# Patient Record
Sex: MALE | Race: WHITE | NOT HISPANIC OR LATINO | Employment: OTHER | URBAN - METROPOLITAN AREA
[De-identification: names, ages, dates, MRNs, and addresses within clinical notes are randomized per-mention and may not be internally consistent; named-entity substitution may affect disease eponyms.]

---

## 2018-02-27 ENCOUNTER — PREP FOR SURGERY (OUTPATIENT)
Dept: OTHER | Facility: HOSPITAL | Age: 68
End: 2018-02-27

## 2018-02-27 DIAGNOSIS — Z80.0 FAMILY HX OF COLON CANCER: Primary | ICD-10-CM

## 2018-02-27 DIAGNOSIS — K21.9 GASTROESOPHAGEAL REFLUX DISEASE, ESOPHAGITIS PRESENCE NOT SPECIFIED: ICD-10-CM

## 2018-03-01 PROBLEM — K21.9 GASTROESOPHAGEAL REFLUX DISEASE: Status: ACTIVE | Noted: 2018-03-01

## 2018-03-01 PROBLEM — Z80.0 FAMILY HX OF COLON CANCER: Status: ACTIVE | Noted: 2018-03-01

## 2018-03-07 ENCOUNTER — ANESTHESIA EVENT (OUTPATIENT)
Dept: GASTROENTEROLOGY | Facility: HOSPITAL | Age: 68
End: 2018-03-07

## 2018-03-07 ENCOUNTER — HOSPITAL ENCOUNTER (OUTPATIENT)
Facility: HOSPITAL | Age: 68
Setting detail: HOSPITAL OUTPATIENT SURGERY
Discharge: HOME | End: 2018-03-07
Attending: SURGERY | Admitting: SURGERY

## 2018-03-07 ENCOUNTER — ANESTHESIA (OUTPATIENT)
Dept: GASTROENTEROLOGY | Facility: HOSPITAL | Age: 68
End: 2018-03-07

## 2018-03-07 VITALS
RESPIRATION RATE: 15 BRPM | BODY MASS INDEX: 24.78 KG/M2 | HEIGHT: 73 IN | WEIGHT: 187 LBS | DIASTOLIC BLOOD PRESSURE: 78 MMHG | OXYGEN SATURATION: 100 % | HEART RATE: 81 BPM | SYSTOLIC BLOOD PRESSURE: 120 MMHG | TEMPERATURE: 97.9 F

## 2018-03-07 DIAGNOSIS — K21.9 GASTROESOPHAGEAL REFLUX DISEASE, ESOPHAGITIS PRESENCE NOT SPECIFIED: ICD-10-CM

## 2018-03-07 DIAGNOSIS — Z80.0 FAMILY HX OF COLON CANCER: ICD-10-CM

## 2018-03-07 PROCEDURE — 88305 TISSUE EXAM BY PATHOLOGIST: CPT | Performed by: SURGERY

## 2018-03-07 PROCEDURE — 25010000002 PROPOFOL 10 MG/ML EMULSION: Performed by: ANESTHESIOLOGY

## 2018-03-07 RX ORDER — PROPOFOL 10 MG/ML
VIAL (ML) INTRAVENOUS AS NEEDED
Status: DISCONTINUED | OUTPATIENT
Start: 2018-03-07 | End: 2018-03-07 | Stop reason: SURG

## 2018-03-07 RX ORDER — SODIUM CHLORIDE, SODIUM LACTATE, POTASSIUM CHLORIDE, CALCIUM CHLORIDE 600; 310; 30; 20 MG/100ML; MG/100ML; MG/100ML; MG/100ML
1000 INJECTION, SOLUTION INTRAVENOUS CONTINUOUS PRN
Status: DISCONTINUED | OUTPATIENT
Start: 2018-03-07 | End: 2018-03-07 | Stop reason: HOSPADM

## 2018-03-07 RX ORDER — SODIUM CHLORIDE 0.9 % (FLUSH) 0.9 %
3 SYRINGE (ML) INJECTION AS NEEDED
Status: DISCONTINUED | OUTPATIENT
Start: 2018-03-07 | End: 2018-03-07 | Stop reason: HOSPADM

## 2018-03-07 RX ORDER — PROPOFOL 10 MG/ML
VIAL (ML) INTRAVENOUS CONTINUOUS PRN
Status: DISCONTINUED | OUTPATIENT
Start: 2018-03-07 | End: 2018-03-07 | Stop reason: SURG

## 2018-03-07 RX ORDER — DEXLANSOPRAZOLE 30 MG/1
30 CAPSULE, DELAYED RELEASE ORAL DAILY
COMMUNITY

## 2018-03-07 RX ORDER — LIDOCAINE HYDROCHLORIDE 20 MG/ML
INJECTION, SOLUTION INFILTRATION; PERINEURAL AS NEEDED
Status: DISCONTINUED | OUTPATIENT
Start: 2018-03-07 | End: 2018-03-07 | Stop reason: SURG

## 2018-03-07 RX ORDER — LIDOCAINE HYDROCHLORIDE 10 MG/ML
0.5 INJECTION, SOLUTION INFILTRATION; PERINEURAL ONCE AS NEEDED
Status: DISCONTINUED | OUTPATIENT
Start: 2018-03-07 | End: 2018-03-07 | Stop reason: HOSPADM

## 2018-03-07 RX ADMIN — PROPOFOL 100 MG: 10 INJECTION, EMULSION INTRAVENOUS at 13:16

## 2018-03-07 RX ADMIN — SODIUM CHLORIDE, POTASSIUM CHLORIDE, SODIUM LACTATE AND CALCIUM CHLORIDE 1000 ML: 600; 310; 30; 20 INJECTION, SOLUTION INTRAVENOUS at 11:45

## 2018-03-07 RX ADMIN — SODIUM CHLORIDE, POTASSIUM CHLORIDE, SODIUM LACTATE AND CALCIUM CHLORIDE: 600; 310; 30; 20 INJECTION, SOLUTION INTRAVENOUS at 13:13

## 2018-03-07 RX ADMIN — PROPOFOL 100 MCG/KG/MIN: 10 INJECTION, EMULSION INTRAVENOUS at 13:17

## 2018-03-07 RX ADMIN — LIDOCAINE HYDROCHLORIDE 100 MG: 20 INJECTION, SOLUTION INFILTRATION; PERINEURAL at 13:17

## 2018-03-07 NOTE — H&P
Fabián Matamoros is a 67 y.o. male who presents today for a EGD and Colonoscopy.  Patient has a family history of colon cancer and is due for recheck this is been 5 years since he was last checked from a colon standpoint.  He also has chronic reflux and has not had an upper endoscopy in quite some time.      Past Medical History:   Diagnosis Date   • Dissection of left main coronary artery    • GERD (gastroesophageal reflux disease)          Objective     Pt is in no distress.  Heart regular.  Chest clear.  Abdomen soft.  Rectal deferred to endoscopy.      Assessment/Plan      Plan a EGD and Colonoscopy today.  Risks and benefits were discussed.  Patient is agreeable.  Final recommendations will follow depending on the results.
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
## 2018-03-07 NOTE — PLAN OF CARE
Problem: Patient Care Overview (Adult)  Goal: Plan of Care Review  Outcome: Ongoing (interventions implemented as appropriate)   03/07/18 1113   Coping/Psychosocial Response Interventions   Plan Of Care Reviewed With patient   Patient Care Overview   Progress no change     Goal: Adult Individualization and Mutuality  Outcome: Ongoing (interventions implemented as appropriate)    Goal: Discharge Needs Assessment  Outcome: Ongoing (interventions implemented as appropriate)   03/07/18 1113   Discharge Needs Assessment   Concerns To Be Addressed no discharge needs identified   Discharge Disposition home or self-care   Living Environment   Transportation Available car;family or friend will provide       Problem: GI Endoscopy (Adult)  Goal: Signs and Symptoms of Listed Potential Problems Will be Absent or Manageable (GI Endoscopy)  Outcome: Ongoing (interventions implemented as appropriate)   03/07/18 1113   GI Endoscopy   Problems Assessed (GI Endoscopy) pain   Problems Present (GI Endoscopy) none

## 2018-03-07 NOTE — ANESTHESIA POSTPROCEDURE EVALUATION
"Patient: Fabián Matamoros    Procedure Summary     Date Anesthesia Start Anesthesia Stop Room / Location    03/07/18 1313 1342  [facility] /  [facility]       Procedure Diagnosis Surgeon Provider    COLONOSCOPY into cecum with cold biopsy polypectomy x1 (N/A ); ESOPHAGOGASTRODUODENOSCOPY with biopsies (N/A Esophagus) Family hx of colon cancer; Gastroesophageal reflux disease, esophagitis presence not specified  (Family hx of colon cancer [Z80.0]; Gastroesophageal reflux disease, esophagitis presence not specified [K21.9]) MD Gerald Beal MD          Anesthesia Type: MAC  Last vitals  BP   113/70 (03/07/18 1124)   Temp   36.6 °C (97.9 °F) (03/07/18 1124)   Pulse   68 (03/07/18 1124)   Resp   16 (03/07/18 1124)     SpO2   97 % (03/07/18 1124)     Post Anesthesia Care and Evaluation    Patient location during evaluation: bedside  Patient participation: complete - patient participated  Level of consciousness: sleepy but conscious  Pain score: 0  Pain management: adequate  Airway patency: patent  Anesthetic complications: No anesthetic complications    Cardiovascular status: acceptable  Respiratory status: acceptable  Hydration status: acceptable    Comments: /70 (BP Location: Left arm, Patient Position: Lying)  Pulse 68  Temp 36.6 °C (97.9 °F) (Oral)   Resp 16  Ht 185.4 cm (73\")  Wt 84.8 kg (187 lb)  SpO2 97%  BMI 24.67 kg/m2        "

## 2018-03-07 NOTE — OP NOTE
March 7, 2018    Fabián Matamoros  [id number]    PROCEDURE: Esophagogastroduodenoscopy with biopsy and colonoscopy to cecum with biopsy.    PREOPERATIVE DIAGNOSIS:  Family hx of colon cancer [Z80.0]  Gastroesophageal reflux disease, esophagitis presence not specified [K21.9]    POSTOPERATIVE DIAGNOSIS: Moderate distal/gastroesophageal junction esophagitis, medium sliding hiatal hernia, rectal polyp..    SURGEON:  Juma Apodaca M.D.    ASSISTANT:  None.    ANESTHESIA:  MAC    ESTIMATED BLOOD LOSS:  Minimal    SPECIMENS:    Order Name Source Comment Collection Info Order Time   TISSUE PATHOLOGY EXAM Esophagus, Distal  Collected By: Juma Apodaca MD 3/7/2018  1:20 PM       INDICATIONS:  Patient is a 67-year-old young man with a family history of colon cancer primary relative who is due for a follow-up colonoscopy and also has chronic reflux for which she's been on medication for years.  He has not had an upper endoscopy by some time so an upper endoscopy was scheduled simultaneously.  He now presents..    PROCEDURE:    Patient was brought into the endoscopy room and confirmed. He was positioned in the left lateral decubitus position.  After administration of adequate IV sedation by anesthesia, an upper endoscope was inserted the posterior pharynx and advanced through the upper GI tract to the third portion of the duodenum. The scope was then pulled back into the stomach and retroflexed.  Picture documentation was performed.  Biopsies were taken. Findings included moderate esophagitis at the GE junction, medium sliding hiatal hernia.  Then, patient was turned around and repositioned for second portion of the procedure.  A digital rectal exam was performed and a colonoscope was inserted and advanced all the way to the cecum.  The scope was then removed with a slow reevaluation on the way out and the procedure was completed. Findings included a small sessile rectal polyp that was removed with cold biopsy forceps.  Procedure was then concluded and my recommendations will depend on final biopsy results and ultimate findings.  Patient will be sent home today with instructions to call for those results.      Juma Apodaca M.D.

## 2018-03-09 LAB
LAB AP CASE REPORT: NORMAL
Lab: NORMAL
PATH REPORT.FINAL DX SPEC: NORMAL
PATH REPORT.GROSS SPEC: NORMAL

## (undated) DEVICE — SINGLE-USE BIOPSY FORCEPS: Brand: RADIAL JAW 4

## (undated) DEVICE — Device: Brand: DEFENDO AIR/WATER/SUCTION AND BIOPSY VALVE

## (undated) DEVICE — CANN NASL CO2 TRULINK W/O2 A/

## (undated) DEVICE — THE TORRENT IRRIGATION SCOPE CONNECTOR IS USED WITH THE TORRENT IRRIGATION TUBING TO PROVIDE IRRIGATION FLUIDS SUCH AS STERILE WATER DURING GASTROINTESTINAL ENDOSCOPIC PROCEDURES WHEN USED IN CONJUNCTION WITH AN IRRIGATION PUMP (OR ELECTROSURGICAL UNIT).: Brand: TORRENT

## (undated) DEVICE — TBG 02 CRUSH RESIST LF CLR 7FT

## (undated) DEVICE — BITEBLOCK OMNI BLOC

## (undated) DEVICE — TUBING, SUCTION, 1/4" X 10', STRAIGHT: Brand: MEDLINE